# Patient Record
Sex: FEMALE | Race: WHITE | Employment: FULL TIME | URBAN - METROPOLITAN AREA
[De-identification: names, ages, dates, MRNs, and addresses within clinical notes are randomized per-mention and may not be internally consistent; named-entity substitution may affect disease eponyms.]

---

## 2020-12-22 ENCOUNTER — OFFICE VISIT (OUTPATIENT)
Dept: ORTHOPEDIC SURGERY | Age: 30
End: 2020-12-22
Payer: COMMERCIAL

## 2020-12-22 VITALS
SYSTOLIC BLOOD PRESSURE: 135 MMHG | RESPIRATION RATE: 15 BRPM | BODY MASS INDEX: 42.07 KG/M2 | HEIGHT: 66 IN | WEIGHT: 261.8 LBS | DIASTOLIC BLOOD PRESSURE: 88 MMHG | HEART RATE: 100 BPM | TEMPERATURE: 97.1 F | OXYGEN SATURATION: 97 %

## 2020-12-22 DIAGNOSIS — M25.511 ACUTE PAIN OF RIGHT SHOULDER: ICD-10-CM

## 2020-12-22 DIAGNOSIS — E66.01 OBESITY, MORBID (HCC): ICD-10-CM

## 2020-12-22 DIAGNOSIS — M75.81 TENDINITIS OF RIGHT ROTATOR CUFF: Primary | ICD-10-CM

## 2020-12-22 PROCEDURE — 99203 OFFICE O/P NEW LOW 30 MIN: CPT | Performed by: ORTHOPAEDIC SURGERY

## 2020-12-22 PROCEDURE — 20611 DRAIN/INJ JOINT/BURSA W/US: CPT | Performed by: ORTHOPAEDIC SURGERY

## 2020-12-22 PROCEDURE — 73030 X-RAY EXAM OF SHOULDER: CPT | Performed by: ORTHOPAEDIC SURGERY

## 2020-12-22 RX ORDER — TRIAMCINOLONE ACETONIDE 40 MG/ML
40 INJECTION, SUSPENSION INTRA-ARTICULAR; INTRAMUSCULAR ONCE
Status: COMPLETED | OUTPATIENT
Start: 2020-12-22 | End: 2020-12-22

## 2020-12-22 RX ORDER — BUSPIRONE HYDROCHLORIDE 10 MG/1
TABLET ORAL
COMMUNITY
Start: 2020-11-06

## 2020-12-22 RX ORDER — EMPAGLIFLOZIN 25 MG/1
TABLET, FILM COATED ORAL
COMMUNITY
Start: 2020-12-13

## 2020-12-22 RX ORDER — NAPROXEN 500 MG/1
TABLET ORAL
COMMUNITY
Start: 2020-12-18

## 2020-12-22 RX ORDER — MELOXICAM 15 MG/1
15 TABLET ORAL
Qty: 30 TAB | Refills: 1 | Status: SHIPPED | OUTPATIENT
Start: 2020-12-22

## 2020-12-22 RX ORDER — METFORMIN HYDROCHLORIDE 1000 MG/1
TABLET ORAL
COMMUNITY
Start: 2020-11-14

## 2020-12-22 RX ORDER — CYCLOBENZAPRINE HCL 5 MG
TABLET ORAL
COMMUNITY
Start: 2020-12-20

## 2020-12-22 RX ORDER — INSULIN GLARGINE 100 [IU]/ML
INJECTION, SOLUTION SUBCUTANEOUS
COMMUNITY
Start: 2020-11-14

## 2020-12-22 RX ORDER — LISDEXAMFETAMINE DIMESYLATE 50 MG/1
CAPSULE ORAL
COMMUNITY
Start: 2020-11-16

## 2020-12-22 RX ORDER — BUPROPION HYDROCHLORIDE 150 MG/1
TABLET, EXTENDED RELEASE ORAL
COMMUNITY
Start: 2020-10-19

## 2020-12-22 RX ADMIN — TRIAMCINOLONE ACETONIDE 40 MG: 40 INJECTION, SUSPENSION INTRA-ARTICULAR; INTRAMUSCULAR at 16:32

## 2020-12-22 NOTE — PROGRESS NOTES
Lieutenant Iglesias  1990   Chief Complaint   Patient presents with    Shoulder Pain     right shoulder pain        HISTORY OF PRESENT ILLNESS  Lieutenant Iglesias is a 26 y.o. female who presents today for evaluation of right shoulder pain. She rates her pain 7/10 today. Pain has been present since September. 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 Has been on light duty at work X 7-8 weeks. Having night pain. Patient describes the pain as aching and throbbing that is Intermittent in nature. Symptoms are worse with bending, straightening, stretching, ROM, Activity and is better with  brace and Naproxen, Ice. Associated symptoms include nothing. Since problem started, it: is unchanged. Pain does wake patient up at night. Has taken Naproxen for the problem. Has tried following treatments: Injections:NO; Brace:NO;  Therapy:NO; Cane/Crutch:NO       Allergies   Allergen Reactions    Shellfish Derived Unknown (comments)        Past Medical History:   Diagnosis Date    [facility]       Social History     Socioeconomic History    Marital status: Not on file     Spouse name: Not on file    Number of children: Not on file    Years of education: Not on file    Highest education level: Not on file   Occupational History    Not on file   Social Needs    Financial resource strain: Not on file    Food insecurity     Worry: Not on file     Inability: Not on file    Transportation needs     Medical: Not on file     Non-medical: Not on file   Tobacco Use    Smoking status: Never Smoker    Smokeless tobacco: Never Used   Substance and Sexual Activity    Alcohol use: Not on file    Drug use: Not on file    Sexual activity: Not on file   Lifestyle    Physical activity     Days per week: Not on file     Minutes per session: Not on file    Stress: Not on file   Relationships    Social connections     Talks on phone: Not on file     Gets together: Not on file     Attends Sikh service: Not on file     Active member of club or organization: Not on file     Attends meetings of clubs or organizations: Not on file     Relationship status: Not on file    Intimate partner violence     Fear of current or ex partner: Not on file     Emotionally abused: Not on file     Physically abused: Not on file     Forced sexual activity: Not on file   Other Topics Concern    Not on file   Social History Narrative    Not on file      History reviewed. No pertinent surgical history. History reviewed. No pertinent family history. Current Outpatient Medications   Medication Sig    metFORMIN (GLUCOPHAGE) 1,000 mg tablet TAKE 1 TABLET BY MOUTH EVERY 12 HOURS    Jardiance 25 mg tablet TAKE 1 TABLET BY MOUTH EVERY DAY    buPROPion SR (WELLBUTRIN SR) 150 mg SR tablet TAKE 1 TABLET BY MOUTH TWICE A DAY    busPIRone (BUSPAR) 10 mg tablet TAKE 1/2 A TABLET BY MOUTH TWICE A DAY FOR 7 DAYS THEN TAKE 1 TABLET BY MOUTH TWICE A DAY    Vyvanse 50 mg cap TAKE 1 CAPSULE BY MOUTH EVERY DAY IN THE MORNING    naproxen (NAPROSYN) 500 mg tablet     cyclobenzaprine (FLEXERIL) 5 mg tablet     Lantus Solostar U-100 Insulin 100 unit/mL (3 mL) inpn INJECT 16 UNITS BENEATH THE SKIN AT BEDTIME. No current facility-administered medications for this visit. REVIEW OF SYSTEM   Patient denies: Weight loss, Fever/Chills, HA, Visual changes, Fatigue, Chest pain, SOB, Abdominal pain, N/V/D/C, Blood in stool or urine, Edema. Pertinent positive as above in HPI. All others were negative    PHYSICAL EXAM:   Visit Vitals  /88 (BP 1 Location: Left arm, BP Patient Position: Sitting)   Pulse 100   Temp 97.1 °F (36.2 °C) (Temporal)   Resp 15   Ht 5' 6\" (1.676 m)   Wt 261 lb 12.8 oz (118.8 kg)   SpO2 97%   BMI 42.26 kg/m²     The patient is a well-developed, well-nourished female   in no acute distress. The patient is alert and oriented times three. The patient is alert and oriented times three. Mood and affect are normal.  LYMPHATIC: lymph nodes are not enlarged and are within normal limits  SKIN: normal in color and non tender to palpation. There are no bruises or abrasions noted. NEUROLOGICAL: Motor sensory exam is within normal limits. Reflexes are equal bilaterally. There is normal sensation to pinprick and light touch  MUSCULOSKELETAL:  Examination Right shoulder   Skin Intact   AC joint tenderness -   Biceps tenderness -   Forward flexion/Elevation    Active abduction    Glenohumeral abduction 90   External rotation ROM 45   Internal rotation ROM 30   Apprehension -   Sudeeps Relocation -   Jerk -   Load and Shift -   Obriens -   Speeds -   Impingement sign +   Supraspinatus/Empty Can -, 5/5   External Rotation Strength -, 5/5   Lift Off/Belly Press -, 5/5   Neurovascular Intact       PROCEDURE: Right Shoulder Injection with Ultrasound Guidance  Indication:Right Shoulder pain/swelling    After sterile prep, 6 cc of Xylocaine and 1 cc of Kenalog were injected into the right shoulder. Ultrasound images captured using [address] Ultrasound machine and scanned into patient's chart.        [facility]  OFFICE PROCEDURE PROGRESS NOTE        Chart reviewed for the following:  Bisi Short M.D, have reviewed the History, Physical and updated the Allergic reactions for [facility]     TIME OUT performed immediately prior to start of procedure:  Bisi Short M.D, have performed the following reviews on [facility] prior to the start of the procedure:            * Patient was identified by name and date of birth   * Agreement on procedure being performed was verified  * Risks and Benefits explained to the patient  * Procedure site verified and marked as necessary  * Patient was positioned for comfort  * Consent was signed and verified     Time: 1:40 PM     Date of procedure: 12/22/2020    Procedure performed by:  Helga Gutierrez Katelin Callaway M.D    Provider assisted by: (see medication administration)    How tolerated by patient: tolerated the procedure well with no complications    Comments: none      IMAGING: XR of right shoulder obtained in the office dated 12/22/2020 was reviewed and read by Dr. Neeraj Peterson: no acute abnormalities      IMPRESSION:      ICD-10-CM ICD-9-CM    1. Tendinitis of right rotator cuff  M75.81 726.10 triamcinolone acetonide (KENALOG-40) 40 mg/mL injection 40 mg      ARTHROCENTESIS ASPIR&/INJ MAJOR JT/BURSA W/US      meloxicam (Mobic) 15 mg tablet   2. Acute pain of right shoulder  M25.511 719.41 AMB POC XRAY, SHOULDER; COMPLETE, 2+   3. Obesity, morbid ([facility]  E66.01 278.01         PLAN:  1. Pt presents today with right shoulder pain due to rotator cuff tendinitis and I would like to try an injection today. Was also given a prescription for Mobic today. Risk factors include: dm, BMI>40  2. No ultrasound exam indicated today  3. Yes cortisone injection indicated today R SHOULDER US  4. No Physical/Occupational Therapy indicated today  5. No diagnostic test indicated today:   6. No durable medical equipment indicated today  7. No referral indicated today   8. Yes medications indicated today: MOBIC  9. No Narcotic indicated today       RTC 3 weeks if pain continues      Scribed by Yocasta Ravi [address]) as dictated by Cris Honeycutt MD    I, Dr. Cris Honeycutt, confirm that all documentation is accurate.     Cris Honeycutt M.D.   [facility]

## 2021-01-20 NOTE — PROGRESS NOTES
Shira Carrion  1990   Chief Complaint   Patient presents with    Shoulder Pain     right        HISTORY OF PRESENT ILLNESS  Shira Carrion is a 26 y.o. female who presents today for reevaluation of right shoulder. Patient rates pain as 5/10 today. Pain has been present since September. 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 At last OV on 12/22/2020, patient had a right shoulder cortisone injection which provided slight relief. She states the injection helped in some ways, in other ways not. Made it easier for her to raise her arm laterally but still having pain. Pain with reaching, picking something up. Still having occasional night pain. Patient denies any fever, chills, chest pain, shortness of breath or calf pain. The remainder of the review of systems is negative. There are no new illness or injuries to report since last seen in the office. There are no changes to medications, allergies, family or social history. Pain Assessment  1/21/2021   Location of Pain Shoulder   Location Modifiers Right   Severity of Pain 5   Quality of Pain Aching   Duration of Pain Persistent   Frequency of Pain Constant   Aggravating Factors Other (Comment)   Aggravating Factors Comment using arm   Limiting Behavior Yes   Relieving Factors Rest;Exercises; Other (Comment)   Relieving Factors Comment TENs unit   Result of Injury No   Work-Related Injury -       PHYSICAL EXAM:   Visit Vitals  BP (!) 134/95 (BP 1 Location: Right arm, BP Patient Position: Sitting)   Pulse (!) 102   Temp 97.5 °F (36.4 °C) (Skin)   Ht 5' 6\" (1.676 m)   Wt 261 lb (118.4 kg)   SpO2 99%   BMI 42.13 kg/m²     The patient is a well-developed, well-nourished female   in no acute distress. The patient is alert and oriented times three. The patient is alert and oriented times three.  Mood and affect are normal.  LYMPHATIC: lymph nodes are not enlarged and are within normal limits  SKIN: normal in color and non tender to palpation. There are no bruises or abrasions noted. NEUROLOGICAL: Motor sensory exam is within normal limits. Reflexes are equal bilaterally. There is normal sensation to pinprick and light touch  MUSCULOSKELETAL:  Examination Right shoulder   Skin Intact   AC joint tenderness -   Biceps tenderness -   Forward flexion/Elevation    Active abduction    Glenohumeral abduction 90   External rotation ROM 45   Internal rotation ROM 30   Apprehension -   Sudeeps Relocation -   Jerk -   Load and Shift -   Obriens -   Speeds -   Impingement sign +   Supraspinatus/Empty Can -, 5/5   External Rotation Strength -, 5/5   Lift Off/Belly Press -, 5/5   Neurovascular Intact        IMAGING: XR of right shoulder obtained in the office dated 12/22/2020 was reviewed and read by Dr. Mike Hooks: no acute abnormalities      IMPRESSION:      ICD-10-CM ICD-9-CM    1. Tear of right rotator cuff, unspecified tear extent, unspecified whether traumatic  M75.101 840.4 MRI SHOULDER RT WO CONT        PLAN:   1. Pt presents today with persistent right shoulder pain despite the cortisone injection given at last OV and I am ordering an MRI to r/o a RCT. Was given a work note keeping her on the same work restrictions until she can be reevaluated in the office. Risk factors include: dm, BMI>40  2. No ultrasound exam indicated today  3. No cortisone injection indicated today   4. No Physical/Occupational Therapy indicated today  5. Yes diagnostic test indicated today: MRI R SHOULDER  6. No durable medical equipment indicated today  7. No referral indicated today   8. No medications indicated today:   9. No Narcotic indicated today      RTC following MRI      Scribed by Jame Rogers) as dictated by Brianda Hall MD    I, Dr. Brianda Hall, confirm that all documentation is accurate.     Brianda Hall M.D.   [facility]

## 2021-01-21 ENCOUNTER — OFFICE VISIT (OUTPATIENT)
Dept: ORTHOPEDIC SURGERY | Age: 31
End: 2021-01-21
Payer: COMMERCIAL

## 2021-01-21 VITALS
HEART RATE: 102 BPM | DIASTOLIC BLOOD PRESSURE: 95 MMHG | WEIGHT: 261 LBS | SYSTOLIC BLOOD PRESSURE: 134 MMHG | BODY MASS INDEX: 41.95 KG/M2 | TEMPERATURE: 97.5 F | HEIGHT: 66 IN | OXYGEN SATURATION: 99 %

## 2021-01-21 DIAGNOSIS — M75.101 TEAR OF RIGHT ROTATOR CUFF, UNSPECIFIED TEAR EXTENT, UNSPECIFIED WHETHER TRAUMATIC: Primary | ICD-10-CM

## 2021-01-21 PROCEDURE — 99213 OFFICE O/P EST LOW 20 MIN: CPT | Performed by: ORTHOPAEDIC SURGERY

## 2021-01-21 NOTE — LETTER
NOTIFICATION RETURN TO WORK / SCHOOL 
 
1/21/2021 8:17 AM 
 
Ms. Mónica Gray [address] [address] To Whom It May Concern: 
 
Mónica Gray is currently under the care of [facility] Dragan Hinckley Oli Bruce. She is to continue with her current restrictions while at work until she can be reevaluated in the office. If there are questions or concerns please have the patient contact our office. Sincerely, Laurita Black MD

## 2021-01-22 DIAGNOSIS — M75.101 TEAR OF RIGHT ROTATOR CUFF, UNSPECIFIED TEAR EXTENT, UNSPECIFIED WHETHER TRAUMATIC: ICD-10-CM

## 2021-02-09 ENCOUNTER — HOSPITAL ENCOUNTER (OUTPATIENT)
Dept: MRI IMAGING | Age: 31
Discharge: HOME | End: 2021-02-09
Attending: ORTHOPAEDIC SURGERY
Payer: COMMERCIAL

## 2021-02-09 PROCEDURE — 73221 MRI JOINT UPR EXTREM W/O DYE: CPT

## 2021-02-11 ENCOUNTER — OFFICE VISIT (OUTPATIENT)
Dept: ORTHOPEDIC SURGERY | Age: 31
End: 2021-02-11
Payer: COMMERCIAL

## 2021-02-11 VITALS
BODY MASS INDEX: 43.23 KG/M2 | HEART RATE: 107 BPM | RESPIRATION RATE: 16 BRPM | HEIGHT: 66 IN | TEMPERATURE: 97.8 F | SYSTOLIC BLOOD PRESSURE: 126 MMHG | DIASTOLIC BLOOD PRESSURE: 82 MMHG | OXYGEN SATURATION: 100 % | WEIGHT: 269 LBS

## 2021-02-11 DIAGNOSIS — M67.921 TENDINOPATHY OF RIGHT BICEPS TENDON: ICD-10-CM

## 2021-02-11 DIAGNOSIS — S46.811A PARTIAL TEAR OF RIGHT SUBSCAPULARIS TENDON, INITIAL ENCOUNTER: Primary | ICD-10-CM

## 2021-02-11 PROCEDURE — 99214 OFFICE O/P EST MOD 30 MIN: CPT | Performed by: ORTHOPAEDIC SURGERY

## 2021-02-11 NOTE — PROGRESS NOTES
Erin Young  1990   Chief Complaint   Patient presents with    Shoulder Pain     right        HISTORY OF PRESENT ILLNESS  Erin Young is a 26 y.o. female who presents today for reevaluation of right shoulder and MRI review. Patient rates pain as 2-3/10 today. Pain has been present since September. 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 The patient had a right shoulder cortisone injection on 12/22/2020 which provided slight relief. She states the injection helped in some ways, in other ways not. Made it easier for her to raise her arm laterally but still having pain. Pain with reaching, picking something up. She does note some improvement in pain since initial onset. Patient denies any fever, chills, chest pain, shortness of breath or calf pain. The remainder of the review of systems is negative. There are no new illness or injuries to report since last seen in the office. There are no changes to medications, allergies, family or social history. Pain Assessment  2/11/2021   Location of Pain Shoulder   Location Modifiers Right   Severity of Pain (No Data)   Quality of Pain Dull;Aching   Duration of Pain A few hours   Frequency of Pain Intermittent   Aggravating Factors Other (Comment)   Aggravating Factors Comment movement   Limiting Behavior Yes   Relieving Factors Ice   Relieving Factors Comment -   Result of Injury -   Work-Related Injury -       PHYSICAL EXAM:   Visit Vitals  /82 (BP 1 Location: Left upper arm)   Pulse (!) 107   Temp 97.8 °F (36.6 °C)   Resp 16   Ht 5' 6\" (1.676 m)   Wt 269 lb (122 kg)   SpO2 100%   BMI 43.42 kg/m²     The patient is a well-developed, well-nourished female   in no acute distress. The patient is alert and oriented times three. The patient is alert and oriented times three.  Mood and affect are normal.  LYMPHATIC: lymph nodes are not enlarged and are within normal limits  SKIN: normal in color and non tender to palpation. There are no bruises or abrasions noted. NEUROLOGICAL: Motor sensory exam is within normal limits. Reflexes are equal bilaterally. There is normal sensation to pinprick and light touch  MUSCULOSKELETAL:  Examination Right shoulder   Skin Intact   AC joint tenderness -   Biceps tenderness -   Forward flexion/Elevation    Active abduction    Glenohumeral abduction 90   External rotation ROM 45   Internal rotation ROM 30   Apprehension -   Sudeeps Relocation -   Jerk -   Load and Shift -   Obriens -   Speeds -   Impingement sign +   Supraspinatus/Empty Can -, 5/5   External Rotation Strength -, 5/5   Lift Off/Belly Press -, 5/5   Neurovascular Intact        IMAGING: MRI of right shoulder dated 2/09/2021 was reviewed and read by Dr. Nestora Sacks:   IMPRESSION:  1. Partial-thickness articular sided subscapularis tendon tear, superior third fibers.  -No significant tendon retraction.  -No muscle atrophy. 2. Medialization of the long head of biceps tendon within the groove, without keo dislocation. No long head of the biceps tendon tear. 3. Mild lateral acromial downsloping. XR of right shoulder obtained in the office dated 12/22/2020 was reviewed and read by Dr. Nestora Sacks: no acute abnormalities      IMPRESSION:      ICD-10-CM ICD-9-CM    1. Partial tear of right subscapularis tendon, initial encounter  S43.81XA 840.5    2. Tendinopathy of right biceps tendon  M67.921 727.9         PLAN:   1. Pt presents today with persistent right shoulder pain due to an MRI-documented partial subscapularis tendon tear and biceps tendinopathy. Surgery was discussed with the patient today but she does note improvement in pain since the initial onset. Will see her back in 6 weeks for re-evaluation of her symptoms. Risk factors include: dm, BMI>40  2. No ultrasound exam indicated today  3. No cortisone injection indicated today   4.  No Physical/Occupational Therapy indicated today  5. No diagnostic test indicated today  6. No durable medical equipment indicated today  7. No referral indicated today   8. No medications indicated today:   9. No Narcotic indicated today      RTC 6 weeks      Scribed by Kathleen Hu) as dictated by Mark Freeman MD    I, Dr. Mark Freeman, confirm that all documentation is accurate.     Mark Freeman M.D.   [facility]